# Patient Record
Sex: FEMALE | URBAN - METROPOLITAN AREA
[De-identification: names, ages, dates, MRNs, and addresses within clinical notes are randomized per-mention and may not be internally consistent; named-entity substitution may affect disease eponyms.]

---

## 2022-08-15 ENCOUNTER — TELEPHONE ENCOUNTER (OUTPATIENT)
Dept: URBAN - METROPOLITAN AREA CLINIC 35 | Facility: CLINIC | Age: 27
End: 2022-08-15

## 2022-08-15 ENCOUNTER — OFFICE VISIT (OUTPATIENT)
Dept: URBAN - METROPOLITAN AREA CLINIC 35 | Facility: CLINIC | Age: 27
End: 2022-08-15
Payer: COMMERCIAL

## 2022-08-15 ENCOUNTER — LAB OUTSIDE AN ENCOUNTER (OUTPATIENT)
Dept: URBAN - METROPOLITAN AREA CLINIC 35 | Facility: CLINIC | Age: 27
End: 2022-08-15

## 2022-08-15 VITALS
DIASTOLIC BLOOD PRESSURE: 82 MMHG | HEIGHT: 61 IN | HEART RATE: 93 BPM | BODY MASS INDEX: 27.19 KG/M2 | WEIGHT: 144 LBS | SYSTOLIC BLOOD PRESSURE: 122 MMHG

## 2022-08-15 DIAGNOSIS — R14.0 ABDOMINAL BLOATING: ICD-10-CM

## 2022-08-15 DIAGNOSIS — R11.2 NAUSEA AND VOMITING, UNSPECIFIED VOMITING TYPE: ICD-10-CM

## 2022-08-15 DIAGNOSIS — R10.13 EPIGASTRIC PAIN: ICD-10-CM

## 2022-08-15 PROCEDURE — 99204 OFFICE O/P NEW MOD 45 MIN: CPT | Performed by: PHYSICIAN ASSISTANT

## 2022-08-15 RX ORDER — FAMOTIDINE 20 MG/1
1 TABLET AT BEDTIME AS NEEDED TABLET, FILM COATED ORAL ONCE A DAY
Qty: 30 | Refills: 3 | OUTPATIENT
Start: 2022-08-15

## 2022-08-15 NOTE — PHYSICAL EXAM NECK/THYROID:
40.2 normal appearance, without tenderness upon palpation, no deformities, no cervical lymphadenopathy, no masses, no thyroid nodules, Thyroid normal size, no JVD, thyroid nontender

## 2022-08-15 NOTE — HPI-ACID REFLUX
28 y/o female patient admits abdominal pain that is located epigastric and is described as a constant heavy pain. Pain started 3 months ago and the patient states that she has 2 episodes per week. Patient admits she was 7 months pregnant when pain started. Patient admits aggravating factors: bread and "heavy" foods. Patient denies any alleviating factors.   Pain lasts about an hour.   She will feel bloated when she has the pain.  Patient has tried Domperidone (medications) and Famotidine with brief relief of her symptoms and takes only when having pain. Patient states that she has not had previous labs, radiology, or procedures. The patient denies any hospital/ER visits for her pain.    Denies a family history of colon cancer or diseases/esophageal or gastric cancer or diseases. Denies a past Colonoscopy/EGD.

## 2022-09-08 ENCOUNTER — TELEPHONE ENCOUNTER (OUTPATIENT)
Dept: URBAN - METROPOLITAN AREA CLINIC 36 | Facility: CLINIC | Age: 27
End: 2022-09-08

## 2022-09-08 ENCOUNTER — LAB OUTSIDE AN ENCOUNTER (OUTPATIENT)
Dept: URBAN - METROPOLITAN AREA CLINIC 35 | Facility: CLINIC | Age: 27
End: 2022-09-08

## 2022-09-09 LAB — LIPASE: 24

## 2022-09-12 ENCOUNTER — DASHBOARD ENCOUNTERS (OUTPATIENT)
Age: 27
End: 2022-09-12

## 2022-09-12 ENCOUNTER — OFFICE VISIT (OUTPATIENT)
Dept: URBAN - METROPOLITAN AREA CLINIC 35 | Facility: CLINIC | Age: 27
End: 2022-09-12
Payer: COMMERCIAL

## 2022-09-12 ENCOUNTER — LAB OUTSIDE AN ENCOUNTER (OUTPATIENT)
Dept: URBAN - METROPOLITAN AREA CLINIC 35 | Facility: CLINIC | Age: 27
End: 2022-09-12

## 2022-09-12 VITALS
BODY MASS INDEX: 26.81 KG/M2 | DIASTOLIC BLOOD PRESSURE: 72 MMHG | SYSTOLIC BLOOD PRESSURE: 118 MMHG | HEART RATE: 73 BPM | HEIGHT: 61 IN | WEIGHT: 142 LBS | OXYGEN SATURATION: 98 %

## 2022-09-12 DIAGNOSIS — K80.50 CHOLEDOCHOLITHIASIS: ICD-10-CM

## 2022-09-12 DIAGNOSIS — R14.0 ABDOMINAL BLOATING: ICD-10-CM

## 2022-09-12 DIAGNOSIS — K76.0 FATTY LIVER: ICD-10-CM

## 2022-09-12 DIAGNOSIS — R11.2 NAUSEA AND VOMITING, UNSPECIFIED VOMITING TYPE: ICD-10-CM

## 2022-09-12 DIAGNOSIS — R10.13 EPIGASTRIC PAIN: ICD-10-CM

## 2022-09-12 PROBLEM — 197321007: Status: ACTIVE | Noted: 2022-09-12

## 2022-09-12 PROCEDURE — 99214 OFFICE O/P EST MOD 30 MIN: CPT | Performed by: PHYSICIAN ASSISTANT

## 2022-09-12 RX ORDER — FAMOTIDINE 20 MG/1
1 TABLET AT BEDTIME AS NEEDED TABLET, FILM COATED ORAL ONCE A DAY
Qty: 30 | Refills: 3 | OUTPATIENT

## 2022-09-12 RX ORDER — FAMOTIDINE 20 MG/1
1 TABLET AT BEDTIME AS NEEDED TABLET, FILM COATED ORAL ONCE A DAY
Status: ACTIVE | COMMUNITY

## 2022-09-12 NOTE — HPI-BLOATING
Patient presents today for a 1-month follow up of abdominal bloating. Patient admits to having two episodes of continued abdominal pain and bloating. Patient admits starting Famotidine Tablet, 20 MG. Patient admits starting the Anti-reflux diet. Patient admits discontinuing Domperidone.   Last visit (8/15/2022): 26 y/o female patient admits abdominal pain that is located epigastric and is described as a constant heavy pain. Pain started 3 months ago and the patient states that she has 2 episodes per week. Patient admits she was 7 months pregnant when pain started. Patient admits aggravating factors: bread and "heavy" foods. Patient denies any alleviating factors.   Pain lasts about an hour.   She will feel bloated when she has the pain.  Patient has tried Domperidone (medications) and Famotidine with brief relief of her symptoms and takes only when having pain. Patient states that she has not had previous labs, radiology, or procedures. The patient denies any hospital/ER visits for her pain.    Denies a family history of colon cancer or diseases/esophageal or gastric cancer or diseases. Denies a past Colonoscopy/EGD.

## 2022-09-12 NOTE — PHYSICAL EXAM HENT:
Head,  normocephalic,  atraumatic,  Face,  Face within normal limits,  Ears,  External ears within normal limits,  Nose/Nasopharynx,  External nose  normal appearance,  nares patent,  no nasal discharge,  Mouth and Throat,  Oral cavity appearance normal,  Breath odor normal,  Lips,  Appearance normal yes

## 2022-10-03 ENCOUNTER — TELEPHONE ENCOUNTER (OUTPATIENT)
Dept: URBAN - METROPOLITAN AREA CLINIC 35 | Facility: CLINIC | Age: 27
End: 2022-10-03